# Patient Record
(demographics unavailable — no encounter records)

---

## 2024-10-18 NOTE — HISTORY OF PRESENT ILLNESS
[FreeTextEntry1] : Marquita Ingram is a 62 yo F with a history of pT2aN0 Clear Cell Renal Cell carcinoma s/p Left radical nephrectomy and LND 1/4/21 (at [facility] [place]) with metastatic recurrence to B/L lungs 2022 currently on pembrolizumab/lenvatinib and belzutifan self-referred to Radiation Oncology for discussion of radiation to the lung.    10/18/24: SNA- Patient here today with sister. She had been in trial of pembrolizumab/lenvatinib/belzutifan at [facility] [place]. She was noted to have POD at several sites on imaging (spine, breast and L flank) while on this regimen and has since been switched to Cabozantinib 40 mg once daily. Patient with low back pain for one week, 6/10 while walking, 0/10 at rest. Denies sob, iWOB, flank and breast pain.   2/23/24: Consultation  Ms. Ingram presents to discuss palliative radiation to the lungs. She reports she has CORDON, denies chest pain. No prior h/o radiation, PPM, or connective tissue disorder. She reports her main side effects from immunotherapy have been joint pain and intermittent gas. She lives alone, family out of the country, and is independent in ADLs.   History of Present Illness  _________________________  10/8/20: Left renal mass found after presenting to ER with Right lower back pain and abdominal pain   1/4/2021: Left radical nephrectomy with LND  Final diagnosis  Clear cell renal carcinoma  Tumor 9.5cm, grade 3, no LVI, negative margins, o/5 nodes positive   9/15/22: CT CAP ([facility] [place]) since 8/4/22, unchanged bilateral pulmonary metastases   11/11/23: CT CAP ([facility] [place]) since 10/7/23, unchanged pulmonary metastases    1/2/24: CT CAP ([facility] [place]) since 11/11/23, increased pulmonary metastases

## 2024-10-18 NOTE — VITALS
[Maximal Pain Intensity: 6/10] : 6/10 [Least Pain Intensity: 0/10] : 0/10 [90: Able to carry normal activity; minor signs or symptoms of disease.] : 90: Able to carry normal activity; minor signs or symptoms of disease.

## 2024-10-18 NOTE — PHYSICAL EXAM
[Sclera] : the sclera and conjunctiva were normal [Extraocular Movements] : extraocular movements were intact [Outer Ear] : the ears and nose were normal in appearance [Hearing Threshold Finger Rub Not [place]] : hearing was normal [] : no respiratory distress [Exaggerated Use Of Accessory Muscles For Inspiration] : no accessory muscle use [Arterial Pulses Normal] : the arterial pulses were normal [Edema] : no peripheral edema present [Nondistended] : nondistended [Abdomen Tenderness] : non-tender [Nail Clubbing] : no clubbing  or cyanosis of the fingernails [Normal] : oriented to person, place and time, the affect was normal, the mood was normal and not anxious [de-identified] : decreased ROM in the L UE d/t joint pain. TTP in the lower back

## 2024-10-18 NOTE — PHYSICAL EXAM
[Sclera] : the sclera and conjunctiva were normal [Extraocular Movements] : extraocular movements were intact [Outer Ear] : the ears and nose were normal in appearance [Hearing Threshold Finger Rub Not [place]] : hearing was normal [] : no respiratory distress [Exaggerated Use Of Accessory Muscles For Inspiration] : no accessory muscle use [Arterial Pulses Normal] : the arterial pulses were normal [Edema] : no peripheral edema present [Nondistended] : nondistended [Abdomen Tenderness] : non-tender [Nail Clubbing] : no clubbing  or cyanosis of the fingernails [Normal] : oriented to person, place and time, the affect was normal, the mood was normal and not anxious [de-identified] : decreased ROM in the L UE d/t joint pain. TTP in the lower back

## 2024-10-18 NOTE — HISTORY OF PRESENT ILLNESS
[FreeTextEntry1] : Marquita Ingram is a 64 yo F with a history of pT2aN0 Clear Cell Renal Cell carcinoma s/p Left radical nephrectomy and LND 1/4/21 (at [facility] [place]) with metastatic recurrence to B/L lungs 2022 currently on pembrolizumab/lenvatinib and belzutifan self-referred to Radiation Oncology for discussion of radiation to the lung.    10/18/24: SNA- Patient here today with sister. She had been in trial of pembrolizumab/lenvatinib/belzutifan at [facility] [place]. She was noted to have POD at several sites on imaging (spine, breast and L flank) while on this regimen and has since been switched to Cabozantinib 40 mg once daily. Patient with low back pain for one week, 6/10 while walking, 0/10 at rest. Denies sob, iWOB, flank and breast pain.   2/23/24: Consultation  Ms. Ingram presents to discuss palliative radiation to the lungs. She reports she has CORDON, denies chest pain. No prior h/o radiation, PPM, or connective tissue disorder. She reports her main side effects from immunotherapy have been joint pain and intermittent gas. She lives alone, family out of the country, and is independent in ADLs.   History of Present Illness  _________________________  10/8/20: Left renal mass found after presenting to ER with Right lower back pain and abdominal pain   1/4/2021: Left radical nephrectomy with LND  Final diagnosis  Clear cell renal carcinoma  Tumor 9.5cm, grade 3, no LVI, negative margins, o/5 nodes positive   9/15/22: CT CAP ([facility] [place]) since 8/4/22, unchanged bilateral pulmonary metastases   11/11/23: CT CAP ([facility] [place]) since 10/7/23, unchanged pulmonary metastases    1/2/24: CT CAP ([facility] [place]) since 11/11/23, increased pulmonary metastases

## 2024-11-09 NOTE — PHYSICAL EXAM
[Fully active, able to carry on all pre-disease performance without restriction] : Status 0 - Fully active, able to carry on all pre-disease performance without restriction [Normal] : affect appropriate [de-identified] : mild erythema on palms

## 2024-11-09 NOTE — ASSESSMENT
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   Per her recollection, she will come off combination study therapy at the 2 year krystian, which will be in July/August of this year after which she believes she will only be continued on lenvatinib. We discussed that based on my reading of the protocol, although pembrolizumab will be discontinued at the 2 year krystian, she should be able to continue on both lenvatinib and belzutifan.   Since her last visit she developed POD on study therapy and has been switched to cabo monotherapy and is undergoing RT to symptomatic lesions. I agreed with Dr. Velarde that further RT at this time would not be favored. We discussed that there are several clinical trials for patients with metastatic RCC refractory to multiple lines of therapy including many at [facility]. She will continue to follow with her team there and we remain available as needed.

## 2024-11-09 NOTE — HISTORY OF PRESENT ILLNESS
[Disease: _____________________] : Disease: [unfilled] [T: ___] : T[unfilled] [N: ___] : N[unfilled] [M: ___] : M[unfilled] [AJCC Stage: ____] : AJCC Stage: [unfilled] [de-identified] : Marquita Akbar is a 63-year-old female who is here today for a second opinion for her known renal cell carcinoma.    Patient has a history of pT2aN0 Clear Cell Renal Cell carcinoma s/p Left radical nephrectomy and LND 1/4/21 (at [facility] [place]) with development of oligometastatic disease to the lungs in mid-2022. She was subsequently enrolled on a trial of pembrolizumab, lenvatinib and belzutifan, on which she remains. She is followed by Dr. Shea at [facility] [place].  Per outside images, she appears to have had radiographic benefit with decrease in size of oligometastatic lesions in the lung.   On February 23, 2024, patient was seen by Dr. Velarde.They discussed with patient that she should continue with her treatment regimen and that they can revisit RT when she completes her trial. They reported that there  is evidence that SBRT to oligometastatic lesions confers added benefit as per the SABR-COMET trial.   At my last visit I recommended she proceed with therapy on study.   She presented for a follow up appt with Dr. Velarde recently after she developed progression of disease to several sites on imaging (spine, breast and L flank) while on this regimen and has since been switched to Cabozantinib 40 mg once daily. She is undergoing RT to flank and breast lesions.       [de-identified] : RCC